# Patient Record
Sex: FEMALE | Race: WHITE | Employment: OTHER | URBAN - METROPOLITAN AREA
[De-identification: names, ages, dates, MRNs, and addresses within clinical notes are randomized per-mention and may not be internally consistent; named-entity substitution may affect disease eponyms.]

---

## 2017-03-17 ENCOUNTER — TELEPHONE (OUTPATIENT)
Dept: UROLOGY | Facility: HOSPITAL | Age: 82
End: 2017-03-17

## 2017-03-17 RX ORDER — ATENOLOL 50 MG/1
50 TABLET ORAL DAILY
COMMUNITY

## 2017-03-17 RX ORDER — SIMVASTATIN 20 MG
20 TABLET ORAL NIGHTLY
COMMUNITY

## 2017-03-17 RX ORDER — ESTRADIOL 0.1 MG/G
1 CREAM VAGINAL
COMMUNITY
End: 2018-06-12

## 2017-03-17 RX ORDER — TRIAMTERENE AND HYDROCHLOROTHIAZIDE 37.5; 25 MG/1; MG/1
1 CAPSULE ORAL EVERY MORNING
COMMUNITY

## 2017-03-17 RX ORDER — RIBOFLAVIN (VITAMIN B2) 100 MG
50 TABLET ORAL
COMMUNITY

## 2017-03-20 ENCOUNTER — OFFICE VISIT (OUTPATIENT)
Dept: UROLOGY | Facility: HOSPITAL | Age: 82
End: 2017-03-20
Attending: OBSTETRICS & GYNECOLOGY
Payer: MEDICARE

## 2017-03-20 VITALS
BODY MASS INDEX: 30.05 KG/M2 | SYSTOLIC BLOOD PRESSURE: 138 MMHG | WEIGHT: 176 LBS | HEIGHT: 64 IN | DIASTOLIC BLOOD PRESSURE: 88 MMHG

## 2017-03-20 DIAGNOSIS — N81.6 RECTOCELE: ICD-10-CM

## 2017-03-20 DIAGNOSIS — N95.2 POSTMENOPAUSAL ATROPHIC VAGINITIS: Primary | ICD-10-CM

## 2017-03-20 DIAGNOSIS — N81.11 MIDLINE CYSTOCELE: ICD-10-CM

## 2017-03-20 DIAGNOSIS — N81.84 PELVIC MUSCLE WASTING: ICD-10-CM

## 2017-03-20 DIAGNOSIS — N39.3 FEMALE STRESS INCONTINENCE: ICD-10-CM

## 2017-03-20 DIAGNOSIS — N39.41 URGE INCONTINENCE: ICD-10-CM

## 2017-03-20 PROCEDURE — 99201 HC OUTPT EVAL AND MGNT NEW PT LEVEL 1: CPT

## 2017-03-20 NOTE — PATIENT INSTRUCTIONS
[facility]    PELVIC MUSCLE EXERCISES [facility])    The muscles that surround the vagina help to support the pelvic organs and maintain bladder and bowel control are called the “pelvic floor muscles”.   Exercises for these musc effort in an active squeeze in order to strengthen the muscles. It is better to maintain a strong active squeeze for a short period of time that to flick the muscle on and off for any period of time.   You will need to work up to a full 10-second squeeze g

## 2017-03-20 NOTE — PROGRESS NOTES
Indu Bynum DO  3/20/2017     Referred by Dr. Niharika Balderas    Patient presents with:  Prolapse: Referred by Dr Rhona Poole for vaginal wall prolapse      HPI:  +EDWINA  +UUI  +prolapse  Vag hyst for bleeding in 1980s & prolapse repair at [address] [place] [address] in 2008 (abd Citrate-Vitamin D (CALCIUM CITRATE + OR) Take 1,000 mg by mouth. Disp:  Rfl:    B Complex Vitamins (VITAMIN B COMPLEX OR) Take 150 mg by mouth. Disp:  Rfl:    Beta Carotene 15 MG Oral Cap Take 15 mg by mouth daily.  Disp:  Rfl:    Loratadine (CLARITIN OR) T surgical treatments for Stress Urinary Incontinence  Nonsurgical and surgical treatments for POP  Pelvic muscle rehabilitation including pelvic floor PT  Topical estrogen therapy for treating UGA  Behavioral and medical treatment for recurrent UTIs      Me

## 2017-03-22 ENCOUNTER — TELEPHONE (OUTPATIENT)
Dept: UROLOGY | Facility: HOSPITAL | Age: 82
End: 2017-03-22

## 2017-03-28 ENCOUNTER — OFFICE VISIT (OUTPATIENT)
Dept: UROLOGY | Facility: HOSPITAL | Age: 82
End: 2017-03-28
Attending: OBSTETRICS & GYNECOLOGY
Payer: MEDICARE

## 2017-03-28 VITALS
HEIGHT: 64 IN | WEIGHT: 176 LBS | BODY MASS INDEX: 30.05 KG/M2 | SYSTOLIC BLOOD PRESSURE: 118 MMHG | DIASTOLIC BLOOD PRESSURE: 78 MMHG

## 2017-03-28 DIAGNOSIS — N81.6 RECTOCELE: ICD-10-CM

## 2017-03-28 DIAGNOSIS — N95.2 POSTMENOPAUSAL ATROPHIC VAGINITIS: Primary | ICD-10-CM

## 2017-03-28 DIAGNOSIS — N81.11 MIDLINE CYSTOCELE: ICD-10-CM

## 2017-03-28 DIAGNOSIS — N39.41 URGE INCONTINENCE: ICD-10-CM

## 2017-03-28 PROCEDURE — 57160 INSERT PESSARY/OTHER DEVICE: CPT

## 2017-03-28 NOTE — PROCEDURES
.Patient here for pessary re-fitting. # 4 ring w/ support pessary was fitted on 3/20 and patient states . #5 ring w/ support pessary was placed without difficulty. Patient able to void with difficulty or pessary falling out.   .Patient also here for educ

## 2017-04-03 NOTE — TELEPHONE ENCOUNTER
Patient's pessary fell out. Patient states pessary feels like it is folded in half. Has an appt 5/28 for  RN check, unable to get in to RN before. Patient states pessary is out and ok with keeping it out until next week. Will call if anything changes.

## 2017-09-05 ENCOUNTER — OFFICE VISIT (OUTPATIENT)
Dept: UROLOGY | Facility: HOSPITAL | Age: 82
End: 2017-09-05
Attending: OBSTETRICS & GYNECOLOGY
Payer: MEDICARE

## 2017-09-05 VITALS
HEIGHT: 64 IN | SYSTOLIC BLOOD PRESSURE: 158 MMHG | WEIGHT: 176 LBS | BODY MASS INDEX: 30.05 KG/M2 | DIASTOLIC BLOOD PRESSURE: 94 MMHG

## 2017-09-05 DIAGNOSIS — N81.11 MIDLINE CYSTOCELE: ICD-10-CM

## 2017-09-05 DIAGNOSIS — N95.2 POSTMENOPAUSAL ATROPHIC VAGINITIS: Primary | ICD-10-CM

## 2017-09-05 DIAGNOSIS — N81.84 PELVIC MUSCLE WASTING: ICD-10-CM

## 2017-09-05 DIAGNOSIS — N39.41 URGE INCONTINENCE: ICD-10-CM

## 2017-09-05 DIAGNOSIS — N39.3 FEMALE STRESS INCONTINENCE: ICD-10-CM

## 2017-09-05 DIAGNOSIS — N81.6 RECTOCELE: ICD-10-CM

## 2017-09-05 PROCEDURE — 99211 OFF/OP EST MAY X REQ PHY/QHP: CPT

## 2017-09-05 NOTE — PROGRESS NOTES
Patient presents to follow up bulge    She is currently using pessary with home care  Vag estrogen  Bladder diet/drill  Overall happy with pessary/results, denies vaginal discharge or bleeding    She reports +improvement     Reviewed provided notes: pessar

## 2018-03-06 ENCOUNTER — OFFICE VISIT (OUTPATIENT)
Dept: UROLOGY | Facility: HOSPITAL | Age: 83
End: 2018-03-06
Attending: OBSTETRICS & GYNECOLOGY
Payer: MEDICARE

## 2018-03-06 VITALS
BODY MASS INDEX: 30.05 KG/M2 | HEIGHT: 64 IN | DIASTOLIC BLOOD PRESSURE: 82 MMHG | SYSTOLIC BLOOD PRESSURE: 122 MMHG | WEIGHT: 176 LBS

## 2018-03-06 DIAGNOSIS — N81.6 RECTOCELE: ICD-10-CM

## 2018-03-06 DIAGNOSIS — N81.84 PELVIC MUSCLE WASTING: ICD-10-CM

## 2018-03-06 DIAGNOSIS — N81.11 MIDLINE CYSTOCELE: Primary | ICD-10-CM

## 2018-03-06 DIAGNOSIS — N95.2 POSTMENOPAUSAL ATROPHIC VAGINITIS: ICD-10-CM

## 2018-03-06 DIAGNOSIS — N39.3 FEMALE STRESS INCONTINENCE: ICD-10-CM

## 2018-03-06 PROCEDURE — 99211 OFF/OP EST MAY X REQ PHY/QHP: CPT

## 2018-03-06 RX ORDER — CEPHALEXIN 500 MG/1
500 CAPSULE ORAL 2 TIMES DAILY
Qty: 14 CAPSULE | Refills: 0 | Status: SHIPPED | OUTPATIENT
Start: 2018-03-06 | End: 2018-06-12 | Stop reason: ALTCHOICE

## 2018-03-06 RX ORDER — AMINO ACIDS/MV,IRON,MIN
TABLET ORAL
COMMUNITY

## 2018-03-06 RX ORDER — AMOXICILLIN AND CLAVULANATE POTASSIUM 875; 125 MG/1; MG/1
1 TABLET, FILM COATED ORAL
COMMUNITY
Start: 2018-03-02 | End: 2018-03-09

## 2018-03-06 RX ORDER — MULTIVIT-MIN/IRON FUM/FOLIC AC 7.5 MG-4
1 TABLET ORAL DAILY
COMMUNITY

## 2018-03-06 NOTE — PROGRESS NOTES
Patient presents to follow up bulge  Patient presents with:  Prolapse:  #5 ring w/ support pessary self maintenance, maintians q 2 weeks, using estrace  Urinary Symptoms (urologic): urgency to urinate for last week, vaginal irritation on urination for last

## 2018-03-06 NOTE — PATIENT INSTRUCTIONS
Each year, thousands of Americans fall at home. Many of them are seriously injured, and some are disabled. All age groups are affected, with adults over age 61 ranking highest for these injuries.     The points below address hazards found in your home 1. Wash hands thoroughly. It is not necessary to wear gloves. 2. Empty your bladder. You may remove the pessary while seated or stand with one foot placed on a stool or the toilet.   3. Relax with your knees apart and insert thumb and forefinger into v

## 2018-03-07 ENCOUNTER — TELEPHONE (OUTPATIENT)
Dept: UROLOGY | Facility: HOSPITAL | Age: 83
End: 2018-03-07

## 2018-03-07 NOTE — TELEPHONE ENCOUNTER
Pt called, incont dish fell out when she got home, went back to her old pessary but wants to try another one, apt made for Monday for pessary check

## 2018-03-12 ENCOUNTER — OFFICE VISIT (OUTPATIENT)
Dept: UROLOGY | Facility: HOSPITAL | Age: 83
End: 2018-03-12
Attending: OBSTETRICS & GYNECOLOGY
Payer: MEDICARE

## 2018-03-12 VITALS
WEIGHT: 176 LBS | SYSTOLIC BLOOD PRESSURE: 110 MMHG | BODY MASS INDEX: 30.05 KG/M2 | HEIGHT: 64 IN | DIASTOLIC BLOOD PRESSURE: 78 MMHG

## 2018-03-12 DIAGNOSIS — N81.6 RECTOCELE: ICD-10-CM

## 2018-03-12 DIAGNOSIS — N95.2 POSTMENOPAUSAL ATROPHIC VAGINITIS: ICD-10-CM

## 2018-03-12 DIAGNOSIS — N81.11 MIDLINE CYSTOCELE: Primary | ICD-10-CM

## 2018-03-12 PROCEDURE — 57160 INSERT PESSARY/OTHER DEVICE: CPT

## 2018-03-12 NOTE — PROCEDURES
.Patient here for pessary re-fitting. #5 incontinence dish pessary was fitted on 3-6-18  and patient states it fell out with activity. She attempted to replace it and it fell out again. Came in with her own previous #5 ring with support.  Removed #5 ring

## 2018-03-27 ENCOUNTER — TELEPHONE (OUTPATIENT)
Dept: UROLOGY | Facility: HOSPITAL | Age: 83
End: 2018-03-27

## 2018-03-27 DIAGNOSIS — R39.9 UTI SYMPTOMS: Primary | ICD-10-CM

## 2018-03-27 NOTE — TELEPHONE ENCOUNTER
Pt calling to say UTI sx have come back after last UTI, 3/4/18. Pt was put on augmentin. Pt states there is a strong odor to urine, sometimes cloudy and she has dysuria, Denies fever. Pt would like to have urine cx done at [address] [place] [address] for a closer location.  Katty Schreiber

## 2018-03-29 ENCOUNTER — TELEPHONE (OUTPATIENT)
Dept: UROLOGY | Facility: HOSPITAL | Age: 83
End: 2018-03-29

## 2018-03-29 DIAGNOSIS — R39.9 UTI SYMPTOMS: Primary | ICD-10-CM

## 2018-03-29 RX ORDER — FOSFOMYCIN TROMETHAMINE 3 G/1
3 GRANULE, FOR SOLUTION ORAL ONCE
Qty: 1 PACKET | Refills: 0 | Status: SHIPPED
Start: 2018-03-29 | End: 2018-03-29

## 2018-03-29 NOTE — TELEPHONE ENCOUNTER
Phoned pt this AM to tell her I called [facility] for her urine cx results, that should have been final today. They apparently never ran the cx and only ran the u/a. At this point they do not have any urine left and pt will need to repeat testing.  Only cx orde

## 2018-04-02 ENCOUNTER — TELEPHONE (OUTPATIENT)
Dept: UROLOGY | Facility: HOSPITAL | Age: 83
End: 2018-04-02

## 2018-04-02 NOTE — TELEPHONE ENCOUNTER
rec'd culture results fax from [facility], >100,000 e-coli, pt took Fosfomycin and is feeling better, she is leaving for a cruise tomorrow and also has an RX from Dr. Dash Villagomez for Keflex if she would develop an infection while on vacation, pt knows to beg

## 2018-06-12 ENCOUNTER — OFFICE VISIT (OUTPATIENT)
Dept: UROLOGY | Facility: HOSPITAL | Age: 83
End: 2018-06-12
Attending: OBSTETRICS & GYNECOLOGY
Payer: MEDICARE

## 2018-06-12 VITALS
HEIGHT: 64 IN | BODY MASS INDEX: 30.05 KG/M2 | WEIGHT: 176 LBS | DIASTOLIC BLOOD PRESSURE: 80 MMHG | SYSTOLIC BLOOD PRESSURE: 118 MMHG

## 2018-06-12 DIAGNOSIS — N39.41 URGE INCONTINENCE: ICD-10-CM

## 2018-06-12 DIAGNOSIS — N90.4 VULVAR DYSTROPHY: ICD-10-CM

## 2018-06-12 DIAGNOSIS — N81.6 RECTOCELE: ICD-10-CM

## 2018-06-12 DIAGNOSIS — B37.3 YEAST VAGINITIS: ICD-10-CM

## 2018-06-12 DIAGNOSIS — N95.2 POSTMENOPAUSAL ATROPHIC VAGINITIS: ICD-10-CM

## 2018-06-12 DIAGNOSIS — N81.11 MIDLINE CYSTOCELE: Primary | ICD-10-CM

## 2018-06-12 DIAGNOSIS — N81.84 PELVIC MUSCLE WASTING: ICD-10-CM

## 2018-06-12 DIAGNOSIS — N39.3 FEMALE STRESS INCONTINENCE: ICD-10-CM

## 2018-06-12 PROCEDURE — 99211 OFF/OP EST MAY X REQ PHY/QHP: CPT

## 2018-06-12 RX ORDER — ESTRADIOL 0.1 MG/G
1 CREAM VAGINAL
Qty: 1 TUBE | Refills: 3 | Status: SHIPPED | OUTPATIENT
Start: 2018-06-12 | End: 2019-06-11

## 2018-06-12 RX ORDER — CLOBETASOL PROPIONATE 0.5 MG/G
CREAM TOPICAL
Qty: 60 G | Refills: 3 | Status: SHIPPED | OUTPATIENT
Start: 2018-06-12 | End: 2019-06-11

## 2018-06-12 RX ORDER — FLUCONAZOLE 150 MG/1
150 TABLET ORAL ONCE
Qty: 2 TABLET | Refills: 1 | Status: SHIPPED | OUTPATIENT
Start: 2018-06-12 | End: 2018-06-12

## 2018-06-12 NOTE — PROGRESS NOTES
Patient presents to follow up bulge    She is currently using pessary (home care)  Denies discharge, some bleeding with last pessary mgmt    UTI while away, took keflex  No s/sx of UTI    +UI increased (minimal bother)  Vag estrogen twice weekly    Vulvar

## 2018-07-02 ENCOUNTER — TELEPHONE (OUTPATIENT)
Dept: UROLOGY | Facility: HOSPITAL | Age: 83
End: 2018-07-02

## 2018-07-02 NOTE — TELEPHONE ENCOUNTER
Received urine culture results obtained at [address] [place] [address] on 6-28-18.  + urine culture. Called patient and LVM with request for condition update.

## 2018-07-02 NOTE — TELEPHONE ENCOUNTER
Patient called reports she went for routine annual physical with PCP, Dr. Mark Jeter on 6-16-18, + urine culture, pt asymptomatic. Was treated for [id number] Citrobactor with Keflex 500mg PO TID x 7 days by PCP.   DEBRA dated 6-28-18 that our office received today

## 2019-06-11 ENCOUNTER — OFFICE VISIT (OUTPATIENT)
Dept: UROLOGY | Facility: HOSPITAL | Age: 84
End: 2019-06-11
Attending: OBSTETRICS & GYNECOLOGY
Payer: MEDICARE

## 2019-06-11 VITALS
WEIGHT: 176 LBS | HEIGHT: 64 IN | DIASTOLIC BLOOD PRESSURE: 78 MMHG | SYSTOLIC BLOOD PRESSURE: 140 MMHG | BODY MASS INDEX: 30.05 KG/M2

## 2019-06-11 DIAGNOSIS — N90.4 VULVAR DYSTROPHY: ICD-10-CM

## 2019-06-11 DIAGNOSIS — N81.6 RECTOCELE: ICD-10-CM

## 2019-06-11 DIAGNOSIS — N81.11 MIDLINE CYSTOCELE: Primary | ICD-10-CM

## 2019-06-11 DIAGNOSIS — N95.2 POSTMENOPAUSAL ATROPHIC VAGINITIS: ICD-10-CM

## 2019-06-11 PROCEDURE — 99211 OFF/OP EST MAY X REQ PHY/QHP: CPT

## 2019-06-11 RX ORDER — CLOBETASOL PROPIONATE 0.5 MG/G
CREAM TOPICAL
Qty: 60 G | Refills: 3 | Status: SHIPPED | OUTPATIENT
Start: 2019-06-11

## 2019-06-11 RX ORDER — ESTRADIOL 0.1 MG/G
1 CREAM VAGINAL
Qty: 1 TUBE | Refills: 3 | Status: SHIPPED | OUTPATIENT
Start: 2019-06-11

## 2019-06-11 NOTE — PROGRESS NOTES
Patient presents to follow up bulge, vulvar dystrophy, vulvovag atrophy    She is currently using vag estrogen, clobetasol, pessary (home care)    kegel exercises  Denies vag discharge or bleeding  No UTIs  Some difficulty with pessary care given arthritis

## 2019-06-11 NOTE — PATIENT INSTRUCTIONS
Cont vag estrogen twice weekly  Clobetasol 1-2 times per week on the outside of the vagina  Continue pessary with home care, call with questions or concerns    SHAYLA [address] [place] [address]    Fingertip Application Method for E When you contract the vaginal muscles, you need to keep your abdominal, buttock and thigh muscles relaxed. This is important and often difficult to do.   If you feel like you are tightening too many muscles, take a deep breath and focus on relaxing your wh Eventually you should be able to hold each contraction for a full 10 seconds.   Once you feel comfortable with the exercises, you should perform them whenever you think of it:  driving your car, standing in lines, at the kitchen sink, in the shower, during

## 2019-07-29 PROBLEM — N60.11: Status: ACTIVE | Noted: 2019-07-29

## 2019-07-29 PROBLEM — Z12.39 SCREENING FOR BREAST CANCER: Status: ACTIVE | Noted: 2019-07-29

## 2021-12-07 ENCOUNTER — WALK IN (OUTPATIENT)
Dept: URGENT CARE | Age: 86
End: 2021-12-07

## 2021-12-07 VITALS
SYSTOLIC BLOOD PRESSURE: 138 MMHG | HEART RATE: 64 BPM | OXYGEN SATURATION: 100 % | HEIGHT: 64 IN | DIASTOLIC BLOOD PRESSURE: 71 MMHG | RESPIRATION RATE: 18 BRPM | BODY MASS INDEX: 30.73 KG/M2 | TEMPERATURE: 96.2 F | WEIGHT: 180 LBS

## 2021-12-07 DIAGNOSIS — H61.23 BILATERAL IMPACTED CERUMEN: Primary | ICD-10-CM

## 2021-12-07 PROCEDURE — 69210 REMOVE IMPACTED EAR WAX UNI: CPT | Performed by: FAMILY MEDICINE

## 2021-12-07 PROCEDURE — 99213 OFFICE O/P EST LOW 20 MIN: CPT | Performed by: FAMILY MEDICINE

## 2021-12-07 RX ORDER — SIMVASTATIN 20 MG
20 TABLET ORAL NIGHTLY
COMMUNITY

## 2021-12-07 RX ORDER — LORATADINE 10 MG/1
10 TABLET ORAL DAILY
COMMUNITY

## 2021-12-07 RX ORDER — ATENOLOL 50 MG/1
50 TABLET ORAL DAILY
COMMUNITY

## 2021-12-07 RX ORDER — MULTIVIT-MIN/IRON/FOLIC ACID/K 18-600-40
CAPSULE ORAL
COMMUNITY

## 2021-12-07 RX ORDER — CARBOXYMETHYLCELLULOSE SODIUM 5 MG/ML
SOLUTION/ DROPS OPHTHALMIC
COMMUNITY

## 2021-12-07 RX ORDER — TRIAMTERENE AND HYDROCHLOROTHIAZIDE 37.5; 25 MG/1; MG/1
1 CAPSULE ORAL EVERY MORNING
COMMUNITY

## 2023-02-17 ENCOUNTER — WALK IN (OUTPATIENT)
Dept: URGENT CARE | Age: 88
End: 2023-02-17

## 2023-02-17 VITALS
WEIGHT: 173 LBS | DIASTOLIC BLOOD PRESSURE: 80 MMHG | OXYGEN SATURATION: 96 % | HEART RATE: 80 BPM | SYSTOLIC BLOOD PRESSURE: 132 MMHG | TEMPERATURE: 96.3 F | RESPIRATION RATE: 20 BRPM | BODY MASS INDEX: 29.7 KG/M2

## 2023-02-17 DIAGNOSIS — R35.0 URINARY FREQUENCY: ICD-10-CM

## 2023-02-17 DIAGNOSIS — N39.0 ACUTE UTI: Primary | ICD-10-CM

## 2023-02-17 LAB
APPEARANCE, POC: ABNORMAL
BILIRUBIN, POC: NEGATIVE
COLOR, POC: YELLOW
GLUCOSE UR-MCNC: NEGATIVE MG/DL
KETONES, POC: NEGATIVE MG/DL
NITRITE, POC: NEGATIVE
OCCULT BLOOD, POC: ABNORMAL
PH UR: 7 [PH] (ref 5–7)
PROT UR-MCNC: ABNORMAL MG/DL
SP GR UR: 1.02 (ref 1–1.03)
UROBILINOGEN UR-MCNC: 0.2 MG/DL (ref 0–1)
WBC (LEUKOCYTE) ESTERASE, POC: ABNORMAL

## 2023-02-17 PROCEDURE — 87186 SC STD MICRODIL/AGAR DIL: CPT | Performed by: CLINICAL MEDICAL LABORATORY

## 2023-02-17 PROCEDURE — 99213 OFFICE O/P EST LOW 20 MIN: CPT | Performed by: PHYSICIAN ASSISTANT

## 2023-02-17 PROCEDURE — 87086 URINE CULTURE/COLONY COUNT: CPT | Performed by: CLINICAL MEDICAL LABORATORY

## 2023-02-17 PROCEDURE — 87088 URINE BACTERIA CULTURE: CPT | Performed by: CLINICAL MEDICAL LABORATORY

## 2023-02-17 PROCEDURE — 81003 URINALYSIS AUTO W/O SCOPE: CPT | Performed by: PHYSICIAN ASSISTANT

## 2023-02-17 RX ORDER — CEPHALEXIN 500 MG/1
500 CAPSULE ORAL 4 TIMES DAILY
Qty: 20 CAPSULE | Refills: 0 | Status: SHIPPED | OUTPATIENT
Start: 2023-02-17 | End: 2023-02-22

## 2023-02-19 LAB — BACTERIA UR CULT: ABNORMAL

## 2024-09-13 ENCOUNTER — TELEPHONE (OUTPATIENT)
Dept: SURGERY | Age: 89
End: 2024-09-13

## 2024-10-07 ENCOUNTER — TELEPHONE (OUTPATIENT)
Dept: SURGERY | Age: 89
End: 2024-10-07

## 2024-10-08 ENCOUNTER — EXTERNAL LAB (OUTPATIENT)
Dept: HEALTH INFORMATION MANAGEMENT | Age: 89
End: 2024-10-08

## 2024-10-08 LAB
ALBUMIN SERPL-MCNC: 3.5 GM/DL (ref 3.5–5.2)
ALP SERPL-CCNC: 69 U/L (ref 35–104)
ALT SERPL-CCNC: 9 U/L
ANION GAP SERPL CALC-SCNC: 18 MMOL/L (ref 10–20)
AST SERPL-CCNC: 25 U/L
BILIRUB SERPL-MCNC: 0.3 MG/DL (ref 0.1–1.2)
BUN SERPL-MCNC: 11 MG/DL (ref 8–23)
CALCIUM SERPL-MCNC: 9.5 MG/DL (ref 8.8–10.2)
CHLORIDE SERPL-SCNC: 99 MMOL/L (ref 98–107)
CO2 SERPL-SCNC: 25 MMOL/L (ref 22–29)
CREAT SERPL-MCNC: 0.9 MG/DL (ref 0.5–0.9)
GFR SERPLBLD SCHWARTZ-ARVRAT: 62 ML/MIN/1.73SQM
GLUCOSE SERPL-MCNC: 126 MG/DL (ref 70–99)
LENGTH OF FAST TIME PATIENT: ABNORMAL H
POTASSIUM SERPL-SCNC: 4.9 MMOL/L (ref 3.3–5.1)
PROT SERPL-MCNC: 6.8 GM/DL (ref 6–8.3)
SODIUM SERPL-SCNC: 137 MMOL/L (ref 136–145)

## 2024-10-15 ENCOUNTER — EXTERNAL LAB (OUTPATIENT)
Dept: HEALTH INFORMATION MANAGEMENT | Age: 89
End: 2024-10-15

## 2024-10-15 LAB — LAB RESULT: NORMAL

## 2024-10-16 ENCOUNTER — TELEPHONE (OUTPATIENT)
Dept: SURGERY | Age: 89
End: 2024-10-16

## 2024-10-18 ENCOUNTER — EXTERNAL LAB (OUTPATIENT)
Dept: HEALTH INFORMATION MANAGEMENT | Age: 89
End: 2024-10-18

## 2024-10-18 LAB — LAB RESULT: NORMAL

## 2024-10-29 ENCOUNTER — EXTERNAL LAB (OUTPATIENT)
Dept: HEALTH INFORMATION MANAGEMENT | Age: 89
End: 2024-10-29

## 2024-10-29 ENCOUNTER — TELEPHONE (OUTPATIENT)
Dept: SURGERY | Age: 89
End: 2024-10-29

## 2024-10-29 LAB
ALBUMIN SERPL-MCNC: 2.9 GM/DL (ref 3.5–5.2)
ALP SERPL-CCNC: 84 U/L (ref 35–104)
ALT SERPL-CCNC: 6 U/L
ANION GAP SERPL CALC-SCNC: 18 MMOL/L (ref 10–20)
AST SERPL-CCNC: 20 U/L
BASOPHILS # BLD: 0.1 X(10)3/UL (ref 0–0.1)
BASOPHILS NFR BLD: 0.5 %
BILIRUB SERPL-MCNC: 0.4 MG/DL (ref 0.1–1.2)
BUN SERPL-MCNC: 12 MG/DL (ref 8–23)
CALCIUM SERPL-MCNC: 8.8 MG/DL (ref 8.8–10.2)
CALCIUM, CORRECTED: 9.7 MG/DL (ref 8.8–10.2)
CEA SERPL-MCNC: 55.3 NG/ML (ref 0–4.7)
CHLORIDE SERPL-SCNC: 96 MMOL/L (ref 98–107)
CO2 SERPL-SCNC: 22 MMOL/L (ref 22–29)
CREAT SERPL-MCNC: 0.7 MG/DL (ref 0.5–0.9)
EOSINOPHIL # BLD: 0.2 X(10)3/UL (ref 0–0.6)
EOSINOPHIL NFR BLD: 1.6 %
ERYTHROCYTE [DISTWIDTH] IN BLOOD: 13.4 % (ref 12–15.2)
FERRITIN SERPL-MCNC: 64.7 NG/ML (ref 13–150)
GFR SERPLBLD SCHWARTZ-ARVRAT: 84 ML/MIN/1.73SQM
GLUCOSE SERPL-MCNC: 112 MG/DL (ref 70–99)
HCT VFR BLD CALC: 26.8 % (ref 34–45)
HGB BLD-MCNC: 8.4 GM/DL (ref 11.3–15.4)
IMM GRANULOCYTES NFR BLD: 0.2 %
LENGTH OF FAST TIME PATIENT: ABNORMAL H
LYMPHOCYTES # BLD: 1.5 X(10)3/UL (ref 1–3.8)
LYMPHOCYTES NFR BLD: 14 %
MCH RBC QN AUTO: 26.5 PG (ref 26–34)
MCHC RBC AUTO-ENTMCNC: 31.3 GM/DL (ref 31.9–35.8)
MCV RBC AUTO: 84.5 FL (ref 79–100)
MONOCYTES # BLD: 1.2 X(10)3/UL (ref 0.3–1.1)
MONOCYTES NFR BLD: 10.7 %
NEUTROPHILS # BLD: 8 X(10)3/UL (ref 1.8–8.7)
NEUTROPHILS NFR BLD: 73 %
PLATELET # BLD: 423 X(10)3/UL (ref 130–370)
PMV BLD AUTO: 8.5 FL (ref 8.9–12.6)
POTASSIUM SERPL-SCNC: 4.1 MMOL/L (ref 3.3–5.1)
PROT SERPL-MCNC: 6.8 GM/DL (ref 6–8.3)
RBC # BLD: 3.17 X(10)6/UL (ref 3.6–5.1)
SODIUM SERPL-SCNC: 132 MMOL/L (ref 136–145)
WBC # BLD: 10.9 X(10)3/UL (ref 3.6–12.9)

## 2024-10-30 ENCOUNTER — CANCER NAVIGATOR (OUTPATIENT)
Dept: ONCOLOGY | Age: 89
End: 2024-10-30

## 2024-11-04 DIAGNOSIS — C18.6 MALIGNANT NEOPLASM OF DESCENDING COLON  (CMD): ICD-10-CM

## 2024-11-04 DIAGNOSIS — C18.7 MALIGNANT NEOPLASM OF SIGMOID COLON  (CMD): Primary | ICD-10-CM

## 2024-11-06 PROCEDURE — 81288 MLH1 GENE: CPT | Performed by: CLINICAL MEDICAL LABORATORY

## 2024-11-07 LAB
ASR DISCLAIMER: NORMAL
CASE RPRT: NORMAL
CLINICAL INFO: NORMAL
PATH REPORT.FINAL DX SPEC: NORMAL
PATH REPORT.FINAL DX SPEC: NORMAL
PATH REPORT.GROSS SPEC: NORMAL
PATH REPORT.MICROSCOPIC SPEC OTHER STN: NORMAL

## 2024-11-11 ENCOUNTER — ONCOLOGY MDC VISIT (OUTPATIENT)
Dept: HEMATOLOGY/ONCOLOGY | Age: 89
End: 2024-11-11
Attending: INTERNAL MEDICINE

## 2024-11-11 ENCOUNTER — CLINICAL ABSTRACT (OUTPATIENT)
Dept: HEALTH INFORMATION MANAGEMENT | Facility: OTHER | Age: 89
End: 2024-11-11

## 2024-11-11 ENCOUNTER — CANCER NAVIGATOR (OUTPATIENT)
Dept: ONCOLOGY | Age: 89
End: 2024-11-11

## 2024-11-11 ENCOUNTER — TELEPHONE (OUTPATIENT)
Dept: HEMATOLOGY/ONCOLOGY | Age: 89
End: 2024-11-11

## 2024-11-11 VITALS
OXYGEN SATURATION: 93 % | DIASTOLIC BLOOD PRESSURE: 70 MMHG | RESPIRATION RATE: 16 BRPM | WEIGHT: 149.36 LBS | SYSTOLIC BLOOD PRESSURE: 122 MMHG | TEMPERATURE: 98.7 F | BODY MASS INDEX: 25.5 KG/M2 | HEIGHT: 64 IN | HEART RATE: 74 BPM

## 2024-11-11 DIAGNOSIS — C18.8 OVERLAPPING MALIGNANT NEOPLASM OF COLON  (CMD): ICD-10-CM

## 2024-11-11 DIAGNOSIS — C18.9 MALIGNANT NEOPLASM OF COLON, UNSPECIFIED PART OF COLON  (CMD): Primary | ICD-10-CM

## 2024-11-11 PROCEDURE — 99202 OFFICE O/P NEW SF 15 MIN: CPT

## 2024-11-11 PROCEDURE — 99205 OFFICE O/P NEW HI 60 MIN: CPT | Performed by: INTERNAL MEDICINE

## 2024-11-11 ASSESSMENT — PAIN SCALES - GENERAL: PAINLEVEL: 0

## 2024-11-12 RX ORDER — ALBUTEROL SULFATE 0.83 MG/ML
5 SOLUTION RESPIRATORY (INHALATION)
OUTPATIENT
Start: 2024-11-12

## 2024-11-12 RX ORDER — PROCHLORPERAZINE MALEATE 10 MG
10 TABLET ORAL EVERY 6 HOURS PRN
OUTPATIENT
Start: 2024-11-12

## 2024-11-12 RX ORDER — DIPHENHYDRAMINE HYDROCHLORIDE 50 MG/ML
50 INJECTION INTRAMUSCULAR; INTRAVENOUS
Start: 2024-11-25

## 2024-11-12 RX ORDER — DIPHENHYDRAMINE HYDROCHLORIDE 50 MG/ML
50 INJECTION INTRAMUSCULAR; INTRAVENOUS
Start: 2024-11-12

## 2024-11-12 RX ORDER — ALBUTEROL SULFATE 90 UG/1
2 INHALANT RESPIRATORY (INHALATION)
OUTPATIENT
Start: 2024-11-12

## 2024-11-12 RX ORDER — FAMOTIDINE 10 MG/ML
20 INJECTION, SOLUTION INTRAVENOUS
OUTPATIENT
Start: 2024-11-12

## 2024-11-12 RX ORDER — ALBUTEROL SULFATE 90 UG/1
2 INHALANT RESPIRATORY (INHALATION)
OUTPATIENT
Start: 2024-11-25

## 2024-11-12 RX ORDER — ALBUTEROL SULFATE 0.83 MG/ML
5 SOLUTION RESPIRATORY (INHALATION)
OUTPATIENT
Start: 2024-11-25

## 2024-11-12 RX ORDER — FAMOTIDINE 10 MG/ML
20 INJECTION, SOLUTION INTRAVENOUS
OUTPATIENT
Start: 2024-11-25

## 2024-11-20 ENCOUNTER — APPOINTMENT (OUTPATIENT)
Dept: SURGERY | Age: 89
End: 2024-11-20

## 2024-11-21 ENCOUNTER — CANCER NAVIGATOR (OUTPATIENT)
Dept: ONCOLOGY | Age: 89
End: 2024-11-21

## 2024-11-22 LAB
METHYLATION PERCENTAGE: 58 %
MLH1 GENE METHYLATION BLD/T QL: DETECTED
SERVICE CMNT-IMP: ABNORMAL
TUMOR PERCENT (%): 50 %
UNIQUE BAR CODE # CURRENT SAMPLE: ABNORMAL

## 2024-11-25 ENCOUNTER — TELEPHONE (OUTPATIENT)
Dept: HEMATOLOGY/ONCOLOGY | Age: 89
End: 2024-11-25

## 2024-11-25 ENCOUNTER — CANCER NAVIGATOR (OUTPATIENT)
Dept: ONCOLOGY | Age: 89
End: 2024-11-25

## 2024-11-25 ENCOUNTER — APPOINTMENT (OUTPATIENT)
Dept: HEMATOLOGY/ONCOLOGY | Age: 89
End: 2024-11-25

## (undated) NOTE — MR AVS SNAPSHOT
[address]  [address]  [address] [place], [place] [id number]  [phone]               Thank you for choosing us for your health care visit with Simeon Petersen DO.   We are glad to serve you and happy to provide you with t Sign up for [employer], your secure online medical record. [employer] will allow you to access patient instructions from your recent visit,  view other health information, and more. To sign up or find more information, go to [email or web address] and cl Don’t eat while distracted and slow down. Avoid over sized portions. Don’t eat while when you’re bored.      EAT THESE FOODS MORE OFTEN: EAT THESE FOODS LESS OFTEN:   Make half your plate fruits and vegetables Highly refined, white starches including wh

## (undated) NOTE — MR AVS SNAPSHOT
[address]  [address]  [address] [place] [address]  [phone]               Thank you for choosing us for your health care visit with Martinez Cazares.   We are glad to serve you and happy to provide you with this summar Beta Carotene 15 MG Caps   Take 15 mg by mouth daily. CALCIUM CITRATE + OR   Take 1,000 mg by mouth. CLARITIN OR   Take by mouth. ESTRACE 0.1 MG/GM Crea   Generic drug:  Estradiol   Place 1 g vaginally every 3 (three) days.